# Patient Record
Sex: FEMALE | Race: WHITE | NOT HISPANIC OR LATINO | Employment: FULL TIME | URBAN - METROPOLITAN AREA
[De-identification: names, ages, dates, MRNs, and addresses within clinical notes are randomized per-mention and may not be internally consistent; named-entity substitution may affect disease eponyms.]

---

## 2023-02-10 ENCOUNTER — MEDICAL CORRESPONDENCE (OUTPATIENT)
Dept: HEALTH INFORMATION MANAGEMENT | Facility: CLINIC | Age: 58
End: 2023-02-10
Payer: COMMERCIAL

## 2023-02-16 ENCOUNTER — TRANSCRIBE ORDERS (OUTPATIENT)
Dept: OTHER | Age: 58
End: 2023-02-16

## 2023-02-16 DIAGNOSIS — M89.9 BONE LESION: ICD-10-CM

## 2023-02-16 DIAGNOSIS — M25.531 RIGHT WRIST PAIN: Primary | ICD-10-CM

## 2023-02-17 ENCOUNTER — TELEPHONE (OUTPATIENT)
Dept: ORTHOPEDICS | Facility: CLINIC | Age: 58
End: 2023-02-17
Payer: COMMERCIAL

## 2023-02-17 NOTE — CONFIDENTIAL NOTE
- A call was placed to the patient. Patient did not answer phone so a voicemail was left.     - Call back number to clinic was given and patient was told to call back.      - If calls back, will let her know we could put her on Dr. Pool's schedule for 8:00 if spot is still open and she calls back before 1:00 so we have time to get records. If not the next available appointment is 2/23.

## 2023-02-17 NOTE — TELEPHONE ENCOUNTER
[facility] Call Center    Phone Message    May a detailed message be left on voicemail: yes     Reason for Call: Other: Hello patient is being referred to  for Bone Lesion/ Right Wrist Pain from Danielle Quinteros PA-C at [facility] but 1st available is 2/23/2023, is there anything sooner ? thank you, Zayra     Action Taken: Other: [facility]    Travel Screening: Not Applicable

## 2023-02-20 NOTE — TELEPHONE ENCOUNTER
Action February 20, 2023 11:18 AM MT   Action Taken Sent a request for imaging from [facility] [phone], [facility] [phone], YOVANNY Carrillo [phone].     DIAGNOSIS: Bone Lesion/ Right Wrist Pain   APPOINTMENT DATE: 02/23/2023   NOTES STATUS DETAILS   OFFICE NOTE from referring provider Care Everywhere 02/10/2023 - Danielle Quinteros PA-C - [facility]   OFFICE NOTE from other specialist Care Everywhere 02/07/2023 - Vinny Hansen MD - [facility]   OPERATIVE REPORT Care Everywhere 12/05/2022 - RT Carpal Tunnel Release   MEDICATION LIST Care Everywhere    EMG (for Spine) Care Everywhere 12/05/2022   LABS     MRI In process Anurag LONDONO:  02/09/2023 - RT Wrist    CARMEN Waddell:  12/30/2021 - RT Wrist   XRAYS (IMAGES & REPORTS) In process CARMEN Waddell [facility]:  02/07/2023 - RT Wrist    YOVANNY Carrillo:  12/15/2021 - RT Wrist

## 2023-02-23 ENCOUNTER — PRE VISIT (OUTPATIENT)
Dept: ORTHOPEDICS | Facility: CLINIC | Age: 58
End: 2023-02-23